# Patient Record
(demographics unavailable — no encounter records)

---

## 2025-02-03 NOTE — DISCUSSION/SUMMARY
[FreeTextEntry1] : Pt is concern about skin lesions on the left arm.  Over the past 2 months.  Physical examination is significant for multiple molluscum contagiosum lesions one of them is more prominent on erythematous base.  Mother states that all of the lesions start that way. Patient is bothered with the lesions so I am referring him to dermatologist for freezing.  I recommended patient not to manipulate or touch the lesions. RTC as needed